# Patient Record
Sex: MALE | Race: WHITE
[De-identification: names, ages, dates, MRNs, and addresses within clinical notes are randomized per-mention and may not be internally consistent; named-entity substitution may affect disease eponyms.]

---

## 2017-10-13 ENCOUNTER — HOSPITAL ENCOUNTER (EMERGENCY)
Dept: HOSPITAL 12 - ER | Age: 72
Discharge: HOME | End: 2017-10-13
Payer: MEDICARE

## 2017-10-13 VITALS — BODY MASS INDEX: 26.37 KG/M2 | WEIGHT: 168 LBS | HEIGHT: 67 IN

## 2017-10-13 DIAGNOSIS — E78.00: ICD-10-CM

## 2017-10-13 DIAGNOSIS — Z79.4: ICD-10-CM

## 2017-10-13 DIAGNOSIS — M54.40: Primary | ICD-10-CM

## 2017-10-13 DIAGNOSIS — E11.9: ICD-10-CM

## 2017-10-13 DIAGNOSIS — Z79.82: ICD-10-CM

## 2017-10-13 PROCEDURE — 96372 THER/PROPH/DIAG INJ SC/IM: CPT

## 2017-10-13 PROCEDURE — A4663 DIALYSIS BLOOD PRESSURE CUFF: HCPCS

## 2017-10-13 PROCEDURE — 99284 EMERGENCY DEPT VISIT MOD MDM: CPT

## 2018-10-18 ENCOUNTER — HOSPITAL ENCOUNTER (EMERGENCY)
Dept: HOSPITAL 12 - ER | Age: 73
Discharge: HOME | End: 2018-10-18
Payer: MEDICARE

## 2018-10-18 VITALS — HEIGHT: 67 IN | BODY MASS INDEX: 27 KG/M2 | WEIGHT: 172 LBS

## 2018-10-18 VITALS — SYSTOLIC BLOOD PRESSURE: 121 MMHG | DIASTOLIC BLOOD PRESSURE: 63 MMHG

## 2018-10-18 DIAGNOSIS — E78.00: ICD-10-CM

## 2018-10-18 DIAGNOSIS — I25.10: ICD-10-CM

## 2018-10-18 DIAGNOSIS — R07.89: Primary | ICD-10-CM

## 2018-10-18 DIAGNOSIS — E11.9: ICD-10-CM

## 2018-10-18 LAB
ALP SERPL-CCNC: 103 U/L (ref 50–136)
ALT SERPL W/O P-5'-P-CCNC: 35 U/L (ref 16–63)
AST SERPL-CCNC: 31 U/L (ref 15–37)
BASOPHILS # BLD AUTO: 0.1 K/UL (ref 0–8)
BASOPHILS NFR BLD AUTO: 1.2 % (ref 0–2)
BILIRUB DIRECT SERPL-MCNC: 0.2 MG/DL (ref 0–0.2)
BILIRUB SERPL-MCNC: 0.6 MG/DL (ref 0.2–1)
BUN SERPL-MCNC: 24 MG/DL (ref 7–18)
CHLORIDE SERPL-SCNC: 101 MMOL/L (ref 98–107)
CO2 SERPL-SCNC: 27 MMOL/L (ref 21–32)
CREAT SERPL-MCNC: 1 MG/DL (ref 0.6–1.3)
EOSINOPHIL # BLD AUTO: 0.2 K/UL (ref 0–0.7)
EOSINOPHIL NFR BLD AUTO: 2.1 % (ref 0–7)
GLUCOSE SERPL-MCNC: 77 MG/DL (ref 74–106)
HCT VFR BLD AUTO: 49.1 % (ref 36.7–47.1)
HGB BLD-MCNC: 16.7 G/DL (ref 12.5–16.3)
LYMPHOCYTES # BLD AUTO: 2.7 K/UL (ref 20–40)
LYMPHOCYTES NFR BLD AUTO: 32.9 % (ref 20.5–51.5)
MCH RBC QN AUTO: 30.2 UUG (ref 23.8–33.4)
MCHC RBC AUTO-ENTMCNC: 34 G/DL (ref 32.5–36.3)
MCV RBC AUTO: 88.4 FL (ref 73–96.2)
MONOCYTES # BLD AUTO: 0.9 K/UL (ref 2–10)
MONOCYTES NFR BLD AUTO: 10.5 % (ref 0–11)
NEUTROPHILS # BLD AUTO: 4.5 K/UL (ref 1.8–8.9)
NEUTROPHILS NFR BLD AUTO: 53.3 % (ref 38.5–71.5)
PLATELET # BLD AUTO: 170 K/UL (ref 152–348)
POTASSIUM SERPL-SCNC: 3.8 MMOL/L (ref 3.5–5.1)
RBC # BLD AUTO: 5.55 MIL/UL (ref 4.06–5.63)
WBC # BLD AUTO: 8.4 K/UL (ref 3.6–10.2)
WS STN SPEC: 7.5 G/DL (ref 6.4–8.2)

## 2018-10-18 PROCEDURE — 93005 ELECTROCARDIOGRAM TRACING: CPT

## 2018-10-18 PROCEDURE — 85730 THROMBOPLASTIN TIME PARTIAL: CPT

## 2018-10-18 PROCEDURE — 84484 ASSAY OF TROPONIN QUANT: CPT

## 2018-10-18 PROCEDURE — 99285 EMERGENCY DEPT VISIT HI MDM: CPT

## 2018-10-18 PROCEDURE — 71045 X-RAY EXAM CHEST 1 VIEW: CPT

## 2018-10-18 PROCEDURE — A4663 DIALYSIS BLOOD PRESSURE CUFF: HCPCS

## 2018-10-18 PROCEDURE — 36415 COLL VENOUS BLD VENIPUNCTURE: CPT

## 2018-10-18 PROCEDURE — 80048 BASIC METABOLIC PNL TOTAL CA: CPT

## 2018-10-18 PROCEDURE — 80076 HEPATIC FUNCTION PANEL: CPT

## 2018-10-18 PROCEDURE — 85025 COMPLETE CBC W/AUTO DIFF WBC: CPT

## 2018-10-18 NOTE — NUR
Patient discharged to home in stable conditon.  Written and verbal after care 
instructions given. 

Patient verbalizes understanding of instructions. Ambulated from ER with stable 
gait. peripheral IV removed prior to d/c. All belongings with a patient.

## 2019-06-22 ENCOUNTER — HOSPITAL ENCOUNTER (EMERGENCY)
Dept: HOSPITAL 12 - ER | Age: 74
Discharge: HOME | End: 2019-06-22
Payer: MEDICARE

## 2019-06-22 VITALS — DIASTOLIC BLOOD PRESSURE: 82 MMHG | SYSTOLIC BLOOD PRESSURE: 139 MMHG

## 2019-06-22 VITALS — BODY MASS INDEX: 27.47 KG/M2 | HEIGHT: 67 IN | WEIGHT: 175 LBS

## 2019-06-22 DIAGNOSIS — Z79.4: ICD-10-CM

## 2019-06-22 DIAGNOSIS — E11.65: ICD-10-CM

## 2019-06-22 DIAGNOSIS — Z95.1: ICD-10-CM

## 2019-06-22 DIAGNOSIS — Z87.09: ICD-10-CM

## 2019-06-22 DIAGNOSIS — Z79.82: ICD-10-CM

## 2019-06-22 DIAGNOSIS — Z79.899: ICD-10-CM

## 2019-06-22 DIAGNOSIS — M25.511: Primary | ICD-10-CM

## 2019-06-22 DIAGNOSIS — E78.00: ICD-10-CM

## 2019-06-22 LAB
ALP SERPL-CCNC: 81 U/L (ref 50–136)
ALT SERPL W/O P-5'-P-CCNC: 35 U/L (ref 16–63)
AST SERPL-CCNC: 18 U/L (ref 15–37)
BASOPHILS # BLD AUTO: 0.1 K/UL (ref 0–8)
BASOPHILS NFR BLD AUTO: 1.1 % (ref 0–2)
BILIRUB DIRECT SERPL-MCNC: 0.1 MG/DL (ref 0–0.2)
BILIRUB SERPL-MCNC: 0.6 MG/DL (ref 0.2–1)
BUN SERPL-MCNC: 19 MG/DL (ref 7–18)
CHLORIDE SERPL-SCNC: 102 MMOL/L (ref 98–107)
CO2 SERPL-SCNC: 29 MMOL/L (ref 21–32)
CREAT SERPL-MCNC: 1.2 MG/DL (ref 0.6–1.3)
EOSINOPHIL # BLD AUTO: 0.2 K/UL (ref 0–0.7)
EOSINOPHIL NFR BLD AUTO: 3 % (ref 0–7)
GLUCOSE SERPL-MCNC: 224 MG/DL (ref 74–106)
HCT VFR BLD AUTO: 48.5 % (ref 36.7–47.1)
HGB BLD-MCNC: 15.9 G/DL (ref 12.5–16.3)
LYMPHOCYTES # BLD AUTO: 2.1 K/UL (ref 20–40)
LYMPHOCYTES NFR BLD AUTO: 38.8 % (ref 20.5–51.5)
MCH RBC QN AUTO: 28.4 UUG (ref 23.8–33.4)
MCHC RBC AUTO-ENTMCNC: 33 G/DL (ref 32.5–36.3)
MCV RBC AUTO: 86.5 FL (ref 73–96.2)
MONOCYTES # BLD AUTO: 0.6 K/UL (ref 2–10)
MONOCYTES NFR BLD AUTO: 11.6 % (ref 0–11)
NEUTROPHILS # BLD AUTO: 2.5 K/UL (ref 1.8–8.9)
NEUTROPHILS NFR BLD AUTO: 45.5 % (ref 38.5–71.5)
PLATELET # BLD AUTO: 186 K/UL (ref 152–348)
POTASSIUM SERPL-SCNC: 4.5 MMOL/L (ref 3.5–5.1)
RBC # BLD AUTO: 5.6 MIL/UL (ref 4.06–5.63)
WBC # BLD AUTO: 5.5 K/UL (ref 3.6–10.2)
WS STN SPEC: 7.1 G/DL (ref 6.4–8.2)

## 2019-06-22 PROCEDURE — 93005 ELECTROCARDIOGRAM TRACING: CPT

## 2019-06-22 PROCEDURE — 83880 ASSAY OF NATRIURETIC PEPTIDE: CPT

## 2019-06-22 PROCEDURE — 96374 THER/PROPH/DIAG INJ IV PUSH: CPT

## 2019-06-22 PROCEDURE — 85025 COMPLETE CBC W/AUTO DIFF WBC: CPT

## 2019-06-22 PROCEDURE — A4663 DIALYSIS BLOOD PRESSURE CUFF: HCPCS

## 2019-06-22 PROCEDURE — 71045 X-RAY EXAM CHEST 1 VIEW: CPT

## 2019-06-22 PROCEDURE — 99284 EMERGENCY DEPT VISIT MOD MDM: CPT

## 2019-06-22 PROCEDURE — 80076 HEPATIC FUNCTION PANEL: CPT

## 2019-06-22 PROCEDURE — 84484 ASSAY OF TROPONIN QUANT: CPT

## 2019-06-22 PROCEDURE — 80048 BASIC METABOLIC PNL TOTAL CA: CPT

## 2019-06-22 PROCEDURE — 36415 COLL VENOUS BLD VENIPUNCTURE: CPT

## 2019-06-22 NOTE — NUR
Patient discharged to home in stable conditon.  Written and verbal after care 
instructions given. 

Patient verbalizes understanding of instructions. Pt. d/c w/ prescription per 
MD order, d/c papers signed, all belongings w/ pt., ID band/IV removed, 
ambulated off unit w/ steady gait accompanied by wife, instructed not to drive, 
NAD

## 2019-06-22 NOTE — NUR
Received report from Lucretia BARNES, assumed care of pt., pt. resting in bed, w/ 
eyes open, family at bedside, NAD

## 2019-06-22 NOTE — NUR
Patient is here with spouse & a daughter, who is an opthalmologist for R 
shoulder and R arms pains since Friday night of last week. No trauma associated 
with the pains. The patient describes the pain as constant, aching, nonprovoked 
& non-radiating.

## 2019-06-22 NOTE — NUR
Pt. given warm blanket, states pain has decreased, dimmed lights for pt 
comfort, bed in low position, all needs met,

## 2021-09-05 ENCOUNTER — HOSPITAL ENCOUNTER (EMERGENCY)
Dept: HOSPITAL 12 - ER | Age: 76
Discharge: LEFT BEFORE DISCHARGE | End: 2021-09-05
Payer: MEDICARE

## 2021-09-05 VITALS — DIASTOLIC BLOOD PRESSURE: 75 MMHG | SYSTOLIC BLOOD PRESSURE: 147 MMHG

## 2021-09-05 VITALS — HEIGHT: 67 IN | WEIGHT: 164 LBS | BODY MASS INDEX: 25.74 KG/M2

## 2021-09-05 DIAGNOSIS — J84.10: ICD-10-CM

## 2021-09-05 DIAGNOSIS — Z53.29: ICD-10-CM

## 2021-09-05 DIAGNOSIS — Z79.4: ICD-10-CM

## 2021-09-05 DIAGNOSIS — Z82.49: ICD-10-CM

## 2021-09-05 DIAGNOSIS — I11.9: ICD-10-CM

## 2021-09-05 DIAGNOSIS — M54.30: ICD-10-CM

## 2021-09-05 DIAGNOSIS — J96.00: ICD-10-CM

## 2021-09-05 DIAGNOSIS — U07.1: Primary | ICD-10-CM

## 2021-09-05 DIAGNOSIS — Z79.82: ICD-10-CM

## 2021-09-05 DIAGNOSIS — E11.9: ICD-10-CM

## 2021-09-05 DIAGNOSIS — E78.00: ICD-10-CM

## 2021-09-05 DIAGNOSIS — Z79.899: ICD-10-CM

## 2021-09-05 DIAGNOSIS — Z95.1: ICD-10-CM

## 2021-09-05 DIAGNOSIS — J12.82: ICD-10-CM

## 2021-09-05 DIAGNOSIS — G47.30: ICD-10-CM

## 2021-09-05 DIAGNOSIS — R94.31: ICD-10-CM

## 2021-09-05 LAB
APPEARANCE UR: CLEAR
BILIRUB UR QL STRIP: NEGATIVE
BUN SERPL-MCNC: 18 MG/DL (ref 7–18)
CHLORIDE SERPL-SCNC: 102 MMOL/L (ref 98–107)
CO2 SERPL-SCNC: 32 MMOL/L (ref 21–32)
COLOR UR: YELLOW
CREAT SERPL-MCNC: 1 MG/DL (ref 0.6–1.3)
DEPRECATED SQUAMOUS URNS QL MICRO: (no result) /HPF
GLUCOSE SERPL-MCNC: 149 MG/DL (ref 74–106)
GLUCOSE UR STRIP-MCNC: (no result) MG/DL
HCT VFR BLD AUTO: 45.5 % (ref 36.7–47.1)
HGB UR QL STRIP: NEGATIVE
KETONES UR STRIP-MCNC: NEGATIVE MG/DL
LEUKOCYTE ESTERASE UR QL STRIP: NEGATIVE
MCH RBC QN AUTO: 28.4 UUG (ref 23.8–33.4)
MCV RBC AUTO: 84.1 FL (ref 73–96.2)
NITRITE UR QL STRIP: NEGATIVE
PH UR STRIP: 7 [PH] (ref 5–8)
PLATELET # BLD AUTO: 205 K/UL (ref 152–348)
POTASSIUM SERPL-SCNC: 3.9 MMOL/L (ref 3.5–5.1)
RBC #/AREA URNS HPF: (no result) /HPF (ref 0–3)
SP GR UR STRIP: 1.01 (ref 1–1.03)
UROBILINOGEN UR STRIP-MCNC: 0.2 E.U./DL
WBC #/AREA URNS HPF: (no result) /HPF
WBC #/AREA URNS HPF: (no result) /HPF (ref 0–3)

## 2021-09-05 PROCEDURE — A4663 DIALYSIS BLOOD PRESSURE CUFF: HCPCS

## 2021-09-05 NOTE — NUR
Patient does not wish to proceed with medical care recommended by Dr. George). 
 Patient given information related to possible complications, up to and 
including death, which could occur as a result of leaving the hospital at this 
time.  Patient verbalizes understanding of risks involved due to leaving 
against medical advice.  Patient has signed AMA form.

## 2022-09-08 ENCOUNTER — HOSPITAL ENCOUNTER (EMERGENCY)
Dept: HOSPITAL 12 - ER | Age: 77
Discharge: HOME | End: 2022-09-08
Payer: MEDICARE

## 2022-09-08 VITALS — BODY MASS INDEX: 25.11 KG/M2 | WEIGHT: 160 LBS | HEIGHT: 67 IN

## 2022-09-08 VITALS — DIASTOLIC BLOOD PRESSURE: 74 MMHG | SYSTOLIC BLOOD PRESSURE: 130 MMHG

## 2022-09-08 DIAGNOSIS — G47.30: ICD-10-CM

## 2022-09-08 DIAGNOSIS — Z79.82: ICD-10-CM

## 2022-09-08 DIAGNOSIS — Z79.84: ICD-10-CM

## 2022-09-08 DIAGNOSIS — J20.8: Primary | ICD-10-CM

## 2022-09-08 DIAGNOSIS — Z79.899: ICD-10-CM

## 2022-09-08 DIAGNOSIS — Z20.822: ICD-10-CM

## 2022-09-08 DIAGNOSIS — Z79.4: ICD-10-CM

## 2022-09-08 DIAGNOSIS — E11.9: ICD-10-CM

## 2022-09-08 DIAGNOSIS — Z95.1: ICD-10-CM

## 2022-09-08 DIAGNOSIS — E78.00: ICD-10-CM

## 2022-09-08 DIAGNOSIS — J84.10: ICD-10-CM

## 2022-09-08 LAB
ALP SERPL-CCNC: 102 U/L (ref 50–136)
ALT SERPL W/O P-5'-P-CCNC: 30 U/L (ref 16–63)
AST SERPL-CCNC: 20 U/L (ref 15–37)
BILIRUB SERPL-MCNC: 0.4 MG/DL (ref 0.2–1)
BUN SERPL-MCNC: 31 MG/DL (ref 7–18)
CHLORIDE SERPL-SCNC: 99 MMOL/L (ref 98–107)
CO2 SERPL-SCNC: 27 MMOL/L (ref 21–32)
CREAT SERPL-MCNC: 1.1 MG/DL (ref 0.6–1.3)
GLUCOSE SERPL-MCNC: 191 MG/DL (ref 74–106)
HCT VFR BLD AUTO: 47 % (ref 36.7–47.1)
MCH RBC QN AUTO: 28.3 UUG (ref 23.8–33.4)
MCV RBC AUTO: 85.8 FL (ref 73–96.2)
PLATELET # BLD AUTO: 242 K/UL (ref 152–348)
POTASSIUM SERPL-SCNC: 4.5 MMOL/L (ref 3.5–5.1)
WS STN SPEC: 7.6 G/DL (ref 6.4–8.2)

## 2022-09-08 PROCEDURE — A4663 DIALYSIS BLOOD PRESSURE CUFF: HCPCS

## 2023-01-19 ENCOUNTER — HOSPITAL ENCOUNTER (EMERGENCY)
Dept: HOSPITAL 12 - ER | Age: 78
Discharge: HOME | End: 2023-01-19
Payer: MEDICARE

## 2023-01-19 VITALS — DIASTOLIC BLOOD PRESSURE: 77 MMHG | SYSTOLIC BLOOD PRESSURE: 120 MMHG

## 2023-01-19 VITALS — BODY MASS INDEX: 25.11 KG/M2 | HEIGHT: 67 IN | WEIGHT: 160 LBS

## 2023-01-19 DIAGNOSIS — Z79.899: ICD-10-CM
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
## 2023-01-19 DIAGNOSIS — Z95.1: ICD-10-CM

## 2023-01-19 DIAGNOSIS — Z86.16: ICD-10-CM

## 2023-01-19 DIAGNOSIS — R11.2: ICD-10-CM

## 2023-01-19 DIAGNOSIS — Z79.4: ICD-10-CM

## 2023-01-19 DIAGNOSIS — S39.012A: Primary | ICD-10-CM

## 2023-01-19 DIAGNOSIS — Z95.5: ICD-10-CM

## 2023-01-19 DIAGNOSIS — J84.10: ICD-10-CM

## 2023-01-19 DIAGNOSIS — G47.30: ICD-10-CM

## 2023-01-19 DIAGNOSIS — E78.00: ICD-10-CM
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
## 2023-01-19 DIAGNOSIS — E11.9: ICD-10-CM

## 2023-01-19 DIAGNOSIS — Z79.82: ICD-10-CM

## 2023-01-19 LAB
APPEARANCE UR: CLEAR
BILIRUB UR QL STRIP: NEGATIVE
COLOR UR: YELLOW
DEPRECATED SQUAMOUS URNS QL MICRO: (no result) /HPF
GLUCOSE UR STRIP-MCNC: (no result) MG/DL
HGB UR QL STRIP: NEGATIVE
KETONES UR STRIP-MCNC: (no result) MG/DL
LEUKOCYTE ESTERASE UR QL STRIP: NEGATIVE
NITRITE UR QL STRIP: NEGATIVE
PH UR STRIP: 5.5 [PH] (ref 5–8)
RBC #/AREA URNS HPF: (no result) /HPF (ref 0–3)
SP GR UR STRIP: >=1.03 (ref 1–1.03)
UROBILINOGEN UR STRIP-MCNC: 0.2 E.U./DL
WBC #/AREA URNS HPF: (no result) /HPF
WBC #/AREA URNS HPF: (no result) /HPF (ref 0–3)

## 2023-01-19 PROCEDURE — 81001 URINALYSIS AUTO W/SCOPE: CPT

## 2023-01-19 PROCEDURE — 99284 EMERGENCY DEPT VISIT MOD MDM: CPT

## 2023-01-19 PROCEDURE — A4663 DIALYSIS BLOOD PRESSURE CUFF: HCPCS

## 2023-01-19 PROCEDURE — 96372 THER/PROPH/DIAG INJ SC/IM: CPT

## 2023-01-19 PROCEDURE — 72100 X-RAY EXAM L-S SPINE 2/3 VWS: CPT

## 2023-01-19 NOTE — NUR
Patient discharged to home in stable condition. A/O x 4. NAD noted. Emesis has 
ceased. All belongings at bedside. Written and verbal after care instructions 
given. Patient verbalizes understanding of instructions. Stressed follow up or 
return to ER for worsening s/s.

## 2025-02-26 NOTE — NUR
Change of shift report from Masha BARNES Patient: Era Carson is a 47 y.o. male who presents today with the following Chief Complaint(s):    Chief Complaint   Patient presents with    Follow-up    Diabetes         Y[place] today. 335, A1c 9.8. not drinking enough water he says. Things are off when no drinking. Not sleeping and cannot go to sleep. Pain keeps him awake. Ultram for pain. Says it helps. Sore throat at times.    Uses ice for relief. Pharyngeal erythema, no pus. Order strep. Flu vaccine -  Current Outpatient Medications   Medication Sig Dispense Refill    divalproex (DEPAKOTE) 500 MG DR tablet Take 1 tablet by mouth 2 times daily 60 tablet 0    Erenumab-aooe (AIMOVIG) 140 MG/ML SOAJ Inject 140 mg into the skin every 30 days 1 Adjustable Dose Pre-filled Pen Syringe 1    naloxone (NARCAN) 4 MG/0.1ML LIQD nasal spray 1 spray by Nasal route as needed for Opioid Reversal 1 each 0    rimegepant sulfate (NURTEC) 75 MG TBDP Take 1 tablet daily, may repeat in 24 hours PRN 16 tablet 1    traMADol (ULTRAM) 50 MG tablet Take 1 tablet by mouth 2 times daily as needed for Pain for up to 28 days. 56 tablet 0    Glucagon (GVOKE HYPOPEN 2-PACK) 1 MG/0.2ML SOAJ INJECT AS NEEDED FOR LOW BLOOD SUGAR AS DIRECTED 0.4 mL 2    insulin glulisine (APIDRA) 100 UNIT/ML injection INJECT 7-12 UNITS BEFORE A MEAL THREE TIMES A DAY 15 mL 1    dicloxacillin (DYNAPEN) 500 MG capsule Take 1 capsule by mouth daily 90 capsule 3    LANTUS 100 UNIT/ML injection vial Inject 15 Units into the skin nightly INJECT 15 UNITS UNDER THE SKIN twice daily before breakfast and nightly. Hold nightly dose if PM BG<90 30 mL 3    Diabetic Shoe MISC by Does not apply route 1 each 0    SUMAtriptan (IMITREX) 50 MG tablet Take 1 tablet by mouth once as needed for Migraine      levocetirizine (XYZAL) 5 MG tablet Take 1 tablet by mouth daily      promethazine (PHENERGAN) 12.5 MG tablet TAKE ONE TABLET BY MOUTH THREE TIMES A DAY BEFORE A MEAL AS NEEDED FOR NAUSEA 18 tablet 0    pantoprazole